# Patient Record
Sex: MALE | Race: WHITE | URBAN - METROPOLITAN AREA
[De-identification: names, ages, dates, MRNs, and addresses within clinical notes are randomized per-mention and may not be internally consistent; named-entity substitution may affect disease eponyms.]

---

## 2017-09-29 RX ORDER — ERYTHROMYCIN 250 MG/1
250 TABLET, COATED ORAL 2 TIMES DAILY
COMMUNITY

## 2017-09-29 RX ORDER — POLYETHYLENE GLYCOL 3350 17 G/17G
17 POWDER, FOR SOLUTION ORAL DAILY
COMMUNITY

## 2017-09-29 RX ORDER — ONDANSETRON 4 MG/1
4-8 TABLET, ORALLY DISINTEGRATING ORAL EVERY 6 HOURS PRN
COMMUNITY

## 2017-09-29 RX ORDER — LIDOCAINE 50 MG/G
1 PATCH TOPICAL EVERY 24 HOURS
COMMUNITY

## 2017-09-29 RX ORDER — MEGESTROL ACETATE 20 MG/1
40 TABLET ORAL EVERY 12 HOURS
COMMUNITY

## 2017-09-29 RX ORDER — BICALUTAMIDE 50 MG/1
50 TABLET, FILM COATED ORAL DAILY
COMMUNITY

## 2017-09-29 RX ORDER — BISACODYL 5 MG/1
5 TABLET, DELAYED RELEASE ORAL DAILY PRN
COMMUNITY

## 2017-10-02 ENCOUNTER — ANESTHESIA EVENT (OUTPATIENT)
Dept: SURGERY | Facility: CLINIC | Age: 57
End: 2017-10-02
Payer: MEDICARE

## 2017-10-02 ENCOUNTER — ANESTHESIA (OUTPATIENT)
Dept: SURGERY | Facility: CLINIC | Age: 57
End: 2017-10-02
Payer: MEDICARE

## 2017-10-02 ENCOUNTER — HOSPITAL ENCOUNTER (OUTPATIENT)
Facility: CLINIC | Age: 57
Discharge: HOME | End: 2017-10-02
Attending: UROLOGY | Admitting: UROLOGY
Payer: MEDICARE

## 2017-10-02 ENCOUNTER — APPOINTMENT (OUTPATIENT)
Dept: GENERAL RADIOLOGY | Facility: CLINIC | Age: 57
End: 2017-10-02
Attending: UROLOGY
Payer: MEDICARE

## 2017-10-02 VITALS
BODY MASS INDEX: 38.79 KG/M2 | RESPIRATION RATE: 14 BRPM | OXYGEN SATURATION: 92 % | SYSTOLIC BLOOD PRESSURE: 125 MMHG | WEIGHT: 292.7 LBS | DIASTOLIC BLOOD PRESSURE: 83 MMHG | HEIGHT: 73 IN | TEMPERATURE: 98.6 F

## 2017-10-02 DIAGNOSIS — N20.1 URETERAL STONE: Primary | ICD-10-CM

## 2017-10-02 LAB
GLUCOSE BLDC GLUCOMTR-MCNC: 293 MG/DL (ref 70–99)
GLUCOSE BLDC GLUCOMTR-MCNC: 378 MG/DL (ref 70–99)

## 2017-10-02 PROCEDURE — 40000277 XR SURGERY CARM FLUORO LESS THAN 5 MIN W STILLS

## 2017-10-02 PROCEDURE — 36000058 ZZH SURGERY LEVEL 3 EA 15 ADDTL MIN: Performed by: UROLOGY

## 2017-10-02 PROCEDURE — 37000008 ZZH ANESTHESIA TECHNICAL FEE, 1ST 30 MIN: Performed by: UROLOGY

## 2017-10-02 PROCEDURE — 88300 SURGICAL PATH GROSS: CPT | Performed by: UROLOGY

## 2017-10-02 PROCEDURE — 25800025 ZZH RX 258: Performed by: UROLOGY

## 2017-10-02 PROCEDURE — C2617 STENT, NON-COR, TEM W/O DEL: HCPCS | Performed by: UROLOGY

## 2017-10-02 PROCEDURE — 40000170 ZZH STATISTIC PRE-PROCEDURE ASSESSMENT II: Performed by: UROLOGY

## 2017-10-02 PROCEDURE — 82962 GLUCOSE BLOOD TEST: CPT | Mod: 91

## 2017-10-02 PROCEDURE — 27210995 ZZH RX 272: Performed by: UROLOGY

## 2017-10-02 PROCEDURE — 25000125 ZZHC RX 250: Performed by: NURSE ANESTHETIST, CERTIFIED REGISTERED

## 2017-10-02 PROCEDURE — 25000132 ZZH RX MED GY IP 250 OP 250 PS 637: Mod: GY | Performed by: UROLOGY

## 2017-10-02 PROCEDURE — A9270 NON-COVERED ITEM OR SERVICE: HCPCS | Performed by: UROLOGY

## 2017-10-02 PROCEDURE — C1758 CATHETER, URETERAL: HCPCS | Performed by: UROLOGY

## 2017-10-02 PROCEDURE — 71000013 ZZH RECOVERY PHASE 1 LEVEL 1 EA ADDTL HR: Performed by: UROLOGY

## 2017-10-02 PROCEDURE — 25000566 ZZH SEVOFLURANE, EA 15 MIN: Performed by: UROLOGY

## 2017-10-02 PROCEDURE — 25000128 H RX IP 250 OP 636: Performed by: NURSE ANESTHETIST, CERTIFIED REGISTERED

## 2017-10-02 PROCEDURE — C1769 GUIDE WIRE: HCPCS | Performed by: UROLOGY

## 2017-10-02 PROCEDURE — 36000056 ZZH SURGERY LEVEL 3 1ST 30 MIN: Performed by: UROLOGY

## 2017-10-02 PROCEDURE — 27210794 ZZH OR GENERAL SUPPLY STERILE: Performed by: UROLOGY

## 2017-10-02 PROCEDURE — 25000125 ZZHC RX 250: Performed by: UROLOGY

## 2017-10-02 PROCEDURE — 25000131 ZZH RX MED GY IP 250 OP 636 PS 637: Mod: GY | Performed by: ANESTHESIOLOGY

## 2017-10-02 PROCEDURE — 37000009 ZZH ANESTHESIA TECHNICAL FEE, EACH ADDTL 15 MIN: Performed by: UROLOGY

## 2017-10-02 PROCEDURE — 71000012 ZZH RECOVERY PHASE 1 LEVEL 1 FIRST HR: Performed by: UROLOGY

## 2017-10-02 PROCEDURE — 88300 SURGICAL PATH GROSS: CPT | Mod: 26 | Performed by: UROLOGY

## 2017-10-02 PROCEDURE — 88300 SURGICAL PATH GROSS: CPT | Mod: 26 | Performed by: PATHOLOGY

## 2017-10-02 PROCEDURE — 25000128 H RX IP 250 OP 636: Performed by: UROLOGY

## 2017-10-02 PROCEDURE — 25000128 H RX IP 250 OP 636: Performed by: ANESTHESIOLOGY

## 2017-10-02 PROCEDURE — 71000027 ZZH RECOVERY PHASE 2 EACH 15 MINS: Performed by: UROLOGY

## 2017-10-02 PROCEDURE — 82365 CALCULUS SPECTROSCOPY: CPT | Performed by: UROLOGY

## 2017-10-02 PROCEDURE — A9270 NON-COVERED ITEM OR SERVICE: HCPCS | Mod: GY | Performed by: UROLOGY

## 2017-10-02 DEVICE — STENT URETERAL DBL PIGTAIL INLAY 6FRX28CM 778628: Type: IMPLANTABLE DEVICE | Site: URETER | Status: FUNCTIONAL

## 2017-10-02 RX ORDER — FENTANYL CITRATE 50 UG/ML
INJECTION, SOLUTION INTRAMUSCULAR; INTRAVENOUS PRN
Status: DISCONTINUED | OUTPATIENT
Start: 2017-10-02 | End: 2017-10-02

## 2017-10-02 RX ORDER — HYDROCODONE BITARTRATE AND ACETAMINOPHEN 5; 325 MG/1; MG/1
1-2 TABLET ORAL EVERY 4 HOURS PRN
Qty: 30 TABLET | Refills: 0 | Status: SHIPPED | OUTPATIENT
Start: 2017-10-02

## 2017-10-02 RX ORDER — FUROSEMIDE 10 MG/ML
INJECTION INTRAMUSCULAR; INTRAVENOUS PRN
Status: DISCONTINUED | OUTPATIENT
Start: 2017-10-02 | End: 2017-10-02

## 2017-10-02 RX ORDER — SODIUM CHLORIDE, SODIUM LACTATE, POTASSIUM CHLORIDE, CALCIUM CHLORIDE 600; 310; 30; 20 MG/100ML; MG/100ML; MG/100ML; MG/100ML
INJECTION, SOLUTION INTRAVENOUS CONTINUOUS PRN
Status: DISCONTINUED | OUTPATIENT
Start: 2017-10-02 | End: 2017-10-02

## 2017-10-02 RX ORDER — FENTANYL CITRATE 0.05 MG/ML
25-50 INJECTION, SOLUTION INTRAMUSCULAR; INTRAVENOUS
Status: DISCONTINUED | OUTPATIENT
Start: 2017-10-02 | End: 2017-10-02 | Stop reason: HOSPADM

## 2017-10-02 RX ORDER — GLYCOPYRROLATE 0.2 MG/ML
INJECTION, SOLUTION INTRAMUSCULAR; INTRAVENOUS PRN
Status: DISCONTINUED | OUTPATIENT
Start: 2017-10-02 | End: 2017-10-02

## 2017-10-02 RX ORDER — MEPERIDINE HYDROCHLORIDE 25 MG/ML
12.5 INJECTION INTRAMUSCULAR; INTRAVENOUS; SUBCUTANEOUS
Status: DISCONTINUED | OUTPATIENT
Start: 2017-10-02 | End: 2017-10-02 | Stop reason: HOSPADM

## 2017-10-02 RX ORDER — PROPOFOL 10 MG/ML
INJECTION, EMULSION INTRAVENOUS PRN
Status: DISCONTINUED | OUTPATIENT
Start: 2017-10-02 | End: 2017-10-02

## 2017-10-02 RX ORDER — TOLTERODINE TARTRATE 2 MG/1
2 TABLET, EXTENDED RELEASE ORAL ONCE
Status: COMPLETED | OUTPATIENT
Start: 2017-10-02 | End: 2017-10-02

## 2017-10-02 RX ORDER — LIDOCAINE HYDROCHLORIDE 20 MG/ML
INJECTION, SOLUTION INFILTRATION; PERINEURAL PRN
Status: DISCONTINUED | OUTPATIENT
Start: 2017-10-02 | End: 2017-10-02

## 2017-10-02 RX ORDER — SODIUM CHLORIDE, SODIUM LACTATE, POTASSIUM CHLORIDE, CALCIUM CHLORIDE 600; 310; 30; 20 MG/100ML; MG/100ML; MG/100ML; MG/100ML
INJECTION, SOLUTION INTRAVENOUS CONTINUOUS
Status: DISCONTINUED | OUTPATIENT
Start: 2017-10-02 | End: 2017-10-02 | Stop reason: HOSPADM

## 2017-10-02 RX ORDER — NEOSTIGMINE METHYLSULFATE 1 MG/ML
VIAL (ML) INJECTION PRN
Status: DISCONTINUED | OUTPATIENT
Start: 2017-10-02 | End: 2017-10-02

## 2017-10-02 RX ORDER — ONDANSETRON 2 MG/ML
4 INJECTION INTRAMUSCULAR; INTRAVENOUS EVERY 30 MIN PRN
Status: DISCONTINUED | OUTPATIENT
Start: 2017-10-02 | End: 2017-10-02 | Stop reason: HOSPADM

## 2017-10-02 RX ORDER — ONDANSETRON 4 MG/1
4 TABLET, ORALLY DISINTEGRATING ORAL EVERY 30 MIN PRN
Status: DISCONTINUED | OUTPATIENT
Start: 2017-10-02 | End: 2017-10-02 | Stop reason: HOSPADM

## 2017-10-02 RX ORDER — HYDROMORPHONE HYDROCHLORIDE 1 MG/ML
.3-.5 INJECTION, SOLUTION INTRAMUSCULAR; INTRAVENOUS; SUBCUTANEOUS EVERY 10 MIN PRN
Status: DISCONTINUED | OUTPATIENT
Start: 2017-10-02 | End: 2017-10-02 | Stop reason: HOSPADM

## 2017-10-02 RX ORDER — CIPROFLOXACIN 500 MG/1
500 TABLET, FILM COATED ORAL 2 TIMES DAILY
Qty: 6 TABLET | Refills: 0 | Status: SHIPPED | OUTPATIENT
Start: 2017-10-02

## 2017-10-02 RX ORDER — NALOXONE HYDROCHLORIDE 0.4 MG/ML
.1-.4 INJECTION, SOLUTION INTRAMUSCULAR; INTRAVENOUS; SUBCUTANEOUS
Status: DISCONTINUED | OUTPATIENT
Start: 2017-10-02 | End: 2017-10-02 | Stop reason: HOSPADM

## 2017-10-02 RX ORDER — GENTAMICIN SULFATE 80 MG/100ML
80 INJECTION, SOLUTION INTRAVENOUS
Status: COMPLETED | OUTPATIENT
Start: 2017-10-02 | End: 2017-10-02

## 2017-10-02 RX ORDER — HYDROCODONE BITARTRATE AND ACETAMINOPHEN 5; 325 MG/1; MG/1
1-2 TABLET ORAL ONCE
Status: COMPLETED | OUTPATIENT
Start: 2017-10-02 | End: 2017-10-02

## 2017-10-02 RX ADMIN — PROPOFOL 200 MG: 10 INJECTION, EMULSION INTRAVENOUS at 12:36

## 2017-10-02 RX ADMIN — SODIUM CHLORIDE, POTASSIUM CHLORIDE, SODIUM LACTATE AND CALCIUM CHLORIDE: 600; 310; 30; 20 INJECTION, SOLUTION INTRAVENOUS at 12:34

## 2017-10-02 RX ADMIN — NEOSTIGMINE METHYLSULFATE 3 MG: 1 INJECTION INTRAMUSCULAR; INTRAVENOUS; SUBCUTANEOUS at 13:35

## 2017-10-02 RX ADMIN — FUROSEMIDE 10 MG: 10 INJECTION, SOLUTION INTRAVENOUS at 13:31

## 2017-10-02 RX ADMIN — DEXMEDETOMIDINE HYDROCHLORIDE 8 MCG: 100 INJECTION, SOLUTION INTRAVENOUS at 13:29

## 2017-10-02 RX ADMIN — FENTANYL CITRATE 50 MCG: 50 INJECTION, SOLUTION INTRAMUSCULAR; INTRAVENOUS at 14:03

## 2017-10-02 RX ADMIN — TOLTERODINE TARTRATE 2 MG: 2 TABLET, FILM COATED ORAL at 14:59

## 2017-10-02 RX ADMIN — MIDAZOLAM HYDROCHLORIDE 1 MG: 1 INJECTION, SOLUTION INTRAMUSCULAR; INTRAVENOUS at 12:52

## 2017-10-02 RX ADMIN — FENTANYL CITRATE 50 MCG: 50 INJECTION, SOLUTION INTRAMUSCULAR; INTRAVENOUS at 14:44

## 2017-10-02 RX ADMIN — GENTAMICIN SULFATE 80 MG: 80 INJECTION, SOLUTION INTRAVENOUS at 12:42

## 2017-10-02 RX ADMIN — HYDROCODONE BITARTRATE AND ACETAMINOPHEN 1 TABLET: 5; 325 TABLET ORAL at 15:02

## 2017-10-02 RX ADMIN — GLYCOPYRROLATE 0.6 MG: 0.2 INJECTION, SOLUTION INTRAMUSCULAR; INTRAVENOUS at 13:35

## 2017-10-02 RX ADMIN — FENTANYL CITRATE 100 MCG: 50 INJECTION, SOLUTION INTRAMUSCULAR; INTRAVENOUS at 12:35

## 2017-10-02 RX ADMIN — FENTANYL CITRATE 50 MCG: 50 INJECTION, SOLUTION INTRAMUSCULAR; INTRAVENOUS at 14:14

## 2017-10-02 RX ADMIN — SUCCINYLCHOLINE CHLORIDE 140 MG: 20 INJECTION, SOLUTION INTRAMUSCULAR; INTRAVENOUS at 12:37

## 2017-10-02 RX ADMIN — LIDOCAINE HYDROCHLORIDE 100 MG: 20 INJECTION, SOLUTION INFILTRATION; PERINEURAL at 12:36

## 2017-10-02 RX ADMIN — ROCURONIUM BROMIDE 20 MG: 10 INJECTION INTRAVENOUS at 12:47

## 2017-10-02 RX ADMIN — INSULIN ASPART 15 UNITS: 100 INJECTION, SOLUTION INTRAVENOUS; SUBCUTANEOUS at 13:02

## 2017-10-02 ASSESSMENT — ENCOUNTER SYMPTOMS: SEIZURES: 0

## 2017-10-02 ASSESSMENT — LIFESTYLE VARIABLES: TOBACCO_USE: 0

## 2017-10-02 NOTE — BRIEF OP NOTE
[facility] Brief Operative Note    Pre-operative diagnosis: LARGE IMPACTED LEFT DISTAL URETERAL STONE,LEFT HYDRO   Post-operative diagnosis SAME   Procedure: Procedure(s):  CYSTOSCOPY, LEFT RETROGRADES, LEFT URETEROSCOPY WITH HOLMIUM LASER LITHOTRIPSY AND LEFT STENT PLACEMENT  - Wound Class: II-Clean Contaminated   Surgeon(s): Surgeon(s) and Role:     * Tomás Montero MD - Primary   Estimated blood loss: 0 mL    Specimens:   ID Type Source Tests Collected by Time Destination   1 : LEFT URETERAL STONE Calculus/Stone Ureter, Left STONE ANALYSIS Tomás Montero MD 10/2/2017  1:32 PM       Findings: 1.3X0.9 CM IMPACTED LEFT DISTAL URETERAL STONE.6F X 28 CM STENT.

## 2017-10-02 NOTE — IP AVS SNAPSHOT
MRN:[id number]                      After Visit Summary   10/2/2017    Jeremy Agrawal    MRN: [id number]           Thank you!     Thank you for choosing [facility] for your care. Our goal is always to provide you with excellent care. Hearing back from our patients is one way we can continue to improve our services. Please take a few minutes to complete the written survey that you may receive in the mail after you visit with us. Thank you!        Patient Information     Date Of Birth          1960        About your hospital stay     You were admitted on:  October 2, 2017 You last received care in the:  [facility] PACU    You were discharged on:  October 2, 2017       Who to Call     For medical emergencies, please call 911.  For non-urgent questions about your medical care, please call your primary care provider or clinic, [phone]  For questions related to your surgery, please call your surgery clinic        Attending Provider     Provider Specialty    Tomás Montero MD Urology       Primary Care Provider Office Phone # Fax #    Sreekanth Yu [phone]      After Care Instructions     Discharge Instructions       Patient to arrange for follow up appointment in 2 weeksE DR GOLDBERG IN [place] IN 2 WKS TO REMOVE STENT.            Encourage fluids       Encourage fluids at home to keep urine clear to light pink            No Aspirin, Ibuprofen or Naproxen products       for 7 - 10 days following surgery                  Further instructions from your care team       Same Day Surgery Discharge Instructions for  Sedation and General Anesthesia       It's not unusual to feel dizzy, light-headed or faint for up to 24 hours after surgery or while taking pain medication.  If you have these symptoms: sit for a few minutes before standing and have someone assist you when you get up to walk or use the bathroom.      You should rest and relax for the next 24 hours. We  recommend you make arrangements to have an adult stay with you for at least 24 hours after your discharge.  Avoid hazardous and strenuous activity.      DO NOT DRIVE any vehicle or operate mechanical equipment for 24 hours following the end of your surgery.  Even though you may feel normal, your reactions may be affected by the medication you have received.      Do not drink alcoholic beverages for 24 hours following surgery.       Slowly progress to your regular diet as you feel able. It's not unusual to feel nauseated and/or vomit after receiving anesthesia.  If you develop these symptoms, drink clear liquids (apple juice, ginger ale, broth, 7-up, etc. ) until you feel better.  If your nausea and vomiting persists for 24 hours, please notify your surgeon.        All narcotic pain medications, along with inactivity and anesthesia, can cause constipation. Drinking plenty of liquids and increasing fiber intake will help.      For any questions of a medical nature, call your surgeon.      Do not make important decisions for 24 hours.      If you had general anesthesia, you may have a sore throat for a couple of days related to the breathing tube used during surgery.  You may use Cepacol lozenges to help with this discomfort.  If it worsens or if you develop a fever, contact your surgeon.       If you feel your pain is not well managed with the pain medications prescribed by your surgeon, please contact your surgeon's office to let them know so they can address your concerns.       **If you have questions or concerns about your procedure,  call Dr. Montero at [phone]**    Cystoscopy, Holmium Laser with Stent Placement Discharge Instructions    Holmium laser lithotripsy was used to break up your kidney stone(s). It is normal to have visible blood in the urine, burning, urgency and frequency following this procedure. These symptoms may last for a few days to weeks.     Diet:    To help pass stone fragments and clear  the blood out of the urine, it is important to drink 6-8 glasses of fluids per day at home - at least 3-4 glasses should be water.       Return to the diet that you were on before the procedure, unless you are given specific diet instructions.    Activity:    Walk short distances and increase as your strength allows.    You may climb stairs.    Do not do strenuous exercise or heavy lifting until approved by surgeon.    Do not drive while taking narcotic pain medications.    Bathing:    You may take a shower.          Stent information    During surgery, a stent was placed in the ureter.  The ureter is the tube that drains urine from the kidney to the bladder.  The stent is placed to dilate (open) the ureter so the stone fragments can pass easily through the ureter or to decrease ureteral swelling after surgery, or to relieve an obstruction. The stent is made of rubber. The upper end of the stent curls in the kidney while the lower end rests in the bladder.    While the stent is in place you may experience the following symptoms:    Blood and/or small blood clots in urine.    Bladder spasm (frequency and urgency of urination).    Discomfort or aching in the back or side where the stent is.    Burning or discomfort at the end of urine stream.    To decrease these symptoms you should:    Take pain medication as prescribed.    Drink plenty of fluids.    If you experience pain at the end of urination try not emptying your bladder completely.    If having discomfort in back or side, decrease activity.    Call your physician if these signs/symptoms are present:    Pain that is not relieved by a short rest or ordered pain medications.    Temperature at or above 101.0 F or chills.    Inability or difficulty urinating.     Excessive blood in urine.    Any questions or concerns.    We gave you 15 units novolog insulin at 3pm for a blood sugar = 293.                Pending Results     Date and Time Order Name Status Description  "   10/2/2017 1406 XR Surgery YVONNE L/T 5 Min Fluoro w Stills In process     10/2/2017 1351 Surgical pathology exam In process     10/2/2017 1333 Stone analysis In process             Admission Information     Date & Time Provider Department Dept. Phone    10/2/2017 Tomás Montero MD [facility] [phone]      Your Vitals Were     Blood Pressure Temperature Respirations Height Weight Pulse Oximetry    134/79 98.8  F (37.1  C) 16 1.854 m (6' 1\") 132.8 kg (292 lb 11.2 oz) 92%    BMI (Body Mass Index)                   38.62 kg/m2           MyChart Information     [facility] lets you send messages to your doctor, view your test results, renew your prescriptions, schedule appointments and more. To sign up, go to [email or web address] . Click on \"Log in\" on the left side of the screen, which will take you to the Welcome page. Then click on \"Sign up Now\" on the right side of the page.     You will be asked to enter the access code listed below, as well as some personal information. Please follow the directions to create your username and password.     Your access code is: [email or web address]  Expires: 2017  1:56 PM     Your access code will  in 90 days. If you need help or a new code, please call your [facility] clinic or [phone].        Care EveryWhere ID     This is your Care EveryWhere ID. This could be used by other organizations to access your [facility] medical records  [id number]        Equal Access to Services     [facility]: Hadii chris stout haderlin Soamanda, waaxda luqadaha, qaybta kaalmada caio, devendra idiin hayaan adeeg kharash la'aan . So [facility] [phone].    ATENCIÓN: Si habla español, tiene a nieto disposición servicios gratuitos de asistencia lingüística. Llame al [phone].    We comply with applicable federal civil rights laws and [place] laws. We do not discriminate on the basis of race, color, national origin, age, disability, sex, sexual orientation, or gender " identity.               Review of your medicines      START taking        Dose / Directions    ciprofloxacin 500 MG tablet   Commonly known as:  CIPRO   Used for:  Ureteral stone        Dose:  500 mg   Take 1 tablet (500 mg) by mouth 2 times daily   Quantity:  6 tablet   Refills:  0       HYDROcodone-acetaminophen 5-325 MG per tablet   Commonly known as:  NORCO   Used for:  Ureteral stone   Notes to Patient:  Had one at 3pm.        Dose:  1-2 tablet   Take 1-2 tablets by mouth every 4 hours as needed for moderate to severe pain (Moderate to Severe Pain)   Quantity:  30 tablet   Refills:  0         CONTINUE these medicines which have NOT CHANGED        Dose / Directions    AMITIZA PO        Dose:  24 mcg   Take 24 mcg by mouth 2 times daily (with meals)   Refills:  0       AMLODIPINE BESYLATE PO        Dose:  10 mg   Take 10 mg by mouth daily   Refills:  0       AVASTIN IV   Indication:  as needed        Dose:  1.25 mg   Inject 1.25 mg into the vein every 3 months   Refills:  0       bicalutamide 50 MG tablet   Commonly known as:  CASODEX        Dose:  50 mg   Take 50 mg by mouth daily   Refills:  0       bisacodyl 5 MG EC tablet   Commonly known as:  DULCOLAX        Dose:  5 mg   Take 5 mg by mouth daily as needed for constipation Take 3 tabs PO with bowel cleanse.  Then can use 2 PO PRN no BM x 2days   Refills:  0       calcium 500 + D 500-400 MG-UNIT Tabs   Generic drug:  Calcium Carb-Cholecalciferol        Dose:  1 tablet   Take 1 tablet by mouth 2 times daily   Refills:  0       CLONIDINE HCL PO        Dose:  0.1-0.2 mg   Take 0.1-0.2 mg by mouth 3 times daily as needed   Refills:  0       denosumab 60 MG/ML Soln injection   Commonly known as:  PROLIA        Dose:  60 mg   Inject 60 mg Subcutaneous every 6 months   Refills:  0       DICYCLOMINE HCL PO        Dose:  10 mg   Take 10 mg by mouth daily   Refills:  0       erythromycin base 250 MG tablet   Commonly known as:  E-MYCIN        Dose:  250 mg   Take 250  mg by mouth 2 times daily   Refills:  0       GABAPENTIN PO        Dose:  300 mg   Take 300 mg by mouth At Bedtime Client states takes 3 at bedtime   Refills:  0       leuprolide 22.5 MG kit   Commonly known as:  LUPRON DEPOT        Dose:  22.5 mg   Inject 22.5 mg into the muscle every 3 months As needed - as directed every 3 months   Refills:  0       LEVEMIR FLEXPEN/FLEXTOUCH 100 UNIT/ML injection   Generic drug:  insulin detemir        Dose:  54 Units   Inject 54 Units Subcutaneous At Bedtime   Refills:  0       lidocaine 5 % Patch   Commonly known as:  LIDODERM        Dose:  1 patch   Place 1 patch onto the skin every 24 hours Use as directed   Refills:  0       LISINOPRIL PO        Dose:  40 mg   Take 40 mg by mouth daily   Refills:  0       megestrol 20 MG tablet   Commonly known as:  MEGACE        Dose:  40 mg   Take 40 mg by mouth every 12 hours   Refills:  0       METOPROLOL TARTRATE PO        Dose:  25 mg   Take 25 mg by mouth daily   Refills:  0       NovoLOG FLEXPEN 100 UNIT/ML injection   Generic drug:  insulin aspart        Inject Subcutaneous 4 times daily (with meals and nightly) Administer up to 40 units under the skin per sliding scale   Refills:  0       ondansetron 4 MG ODT tab   Commonly known as:  ZOFRAN-ODT        Dose:  4-8 mg   Take 4-8 mg by mouth every 6 hours as needed for nausea   Refills:  0       PANTOPRAZOLE SODIUM PO        Dose:  40 mg   Take 40 mg by mouth every morning (before breakfast)   Refills:  0       polyethylene glycol powder   Commonly known as:  MIRALAX/GLYCOLAX        Dose:  17 g   Take 17 g by mouth daily   Refills:  0       ROSUVASTATIN CALCIUM PO        Dose:  40 mg   Take 40 mg by mouth daily   Refills:  0       TAMSULOSIN HCL PO        Dose:  0.8 mg   Take 0.8 mg by mouth At Bedtime   Refills:  0         STOP taking     AMITRIPTYLINE HCL PO                Where to get your medicines      These medications were sent to [facility] [place], [place] - [address]  [address] [place] [address]     Phone:  [phone]     ciprofloxacin 500 MG tablet         Some of these will need a paper prescription and others can be bought over the counter. Ask your nurse if you have questions.     Bring a paper prescription for each of these medications     HYDROcodone-acetaminophen 5-325 MG per tablet               ANTIBIOTIC INSTRUCTION     You've Been Prescribed an Antibiotic - Now What?  Your healthcare team thinks that you or your loved one might have an infection. Some infections can be treated with antibiotics, which are powerful, life-saving drugs. Like all medications, antibiotics have side effects and should only be used when necessary. There are some important things you should know about your antibiotic treatment.      Your healthcare team may run tests before you start taking an antibiotic.    Your team may take samples (e.g., from your blood, urine or other areas) to run tests to look for bacteria. These test can be important to determine if you need an antibiotic at all and, if you do, which antibiotic will work best.      Within a few days, your healthcare team might change or even stop your antibiotic.    Your team may start you on an antibiotic while they are working to find out what is making you sick.    Your team might change your antibiotic because test results show that a different antibiotic would be better to treat your infection.    In some cases, once your team has more information, they learn that you do not need an antibiotic at all. They may find out that you don't have an infection, or that the antibiotic you're taking won't work against your infection. For example, an infection caused by a virus can't be treated with antibiotics. Staying on an antibiotic when you don't need it is more likely to be harmful than helpful.      You may experience side effects from your antibiotic.    Like all medications, antibiotics have side  effects. Some of these can be serious.    Let you healthcare team know if you have any known allergies when you are admitted to the hospital.    One significant side effect of nearly all antibiotics is the risk of severe and sometimes deadly diarrhea caused by Clostridium difficile (C. Difficile). This occurs when a person takes antibiotics because some good germs are destroyed. Antibiotic use allows C. diificile to take over, putting patients at high risk for this serious infection.    As a patient or caregiver, it is important to understand your or your loved one's antibiotic treatment. It is especially important for caregivers to speak up when patients can't speak for themselves. Here are some important questions to ask your healthcare team.    What infection is this antibiotic treating and how do you know I have that infection?    What side effects might occur from this antibiotic?    How long will I need to take this antibiotic?    Is it safe to take this antibiotic with other medications or supplements (e.g., vitamins) that I am taking?     Are there any special directions I need to know about taking this antibiotic? For example, should I take it with food?    How will I be monitored to know whether my infection is responding to the antibiotic?    What tests may help to make sure the right antibiotic is prescribed for me?      Information provided by:  www.cdc.gov/getsmart  U.S. Department of Health and Human Services  Centers for disease Control and Prevention  National Center for Emerging and Zoonotic Infectious Diseases  Division of Healthcare Quality Promotion         Protect others around you: Learn how to safely use, store and throw away your medicines at www.disposemymeds.org.             Medication List: This is a list of all your medications and when to take them. Check marks below indicate your daily home schedule. Keep this list as a reference.      Medications           Morning Afternoon Evening  Bedtime As Needed    AMITIZA PO   Take 24 mcg by mouth 2 times daily (with meals)                                AMLODIPINE BESYLATE PO   Take 10 mg by mouth daily                                AVASTIN IV   Inject 1.25 mg into the vein every 3 months                                bicalutamide 50 MG tablet   Commonly known as:  CASODEX   Take 50 mg by mouth daily                                bisacodyl 5 MG EC tablet   Commonly known as:  DULCOLAX   Take 5 mg by mouth daily as needed for constipation Take 3 tabs PO with bowel cleanse.  Then can use 2 PO PRN no BM x 2days                                calcium 500 + D 500-400 MG-UNIT Tabs   Take 1 tablet by mouth 2 times daily   Generic drug:  Calcium Carb-Cholecalciferol                                ciprofloxacin 500 MG tablet   Commonly known as:  CIPRO   Take 1 tablet (500 mg) by mouth 2 times daily                                CLONIDINE HCL PO   Take 0.1-0.2 mg by mouth 3 times daily as needed                                denosumab 60 MG/ML Soln injection   Commonly known as:  PROLIA   Inject 60 mg Subcutaneous every 6 months                                DICYCLOMINE HCL PO   Take 10 mg by mouth daily                                erythromycin base 250 MG tablet   Commonly known as:  E-MYCIN   Take 250 mg by mouth 2 times daily                                GABAPENTIN PO   Take 300 mg by mouth At Bedtime Client states takes 3 at bedtime                                HYDROcodone-acetaminophen 5-325 MG per tablet   Commonly known as:  NORCO   Take 1-2 tablets by mouth every 4 hours as needed for moderate to severe pain (Moderate to Severe Pain)   Last time this was given:  1 tablet on 10/2/2017  3:02 PM   Notes to Patient:  Had one at 3pm.                                leuprolide 22.5 MG kit   Commonly known as:  LUPRON DEPOT   Inject 22.5 mg into the muscle every 3 months As needed - as directed every 3 months                                MANUEL  FLEXPEN/FLEXTOUCH 100 UNIT/ML injection   Inject 54 Units Subcutaneous At Bedtime   Generic drug:  insulin detemir                                lidocaine 5 % Patch   Commonly known as:  LIDODERM   Place 1 patch onto the skin every 24 hours Use as directed                                LISINOPRIL PO   Take 40 mg by mouth daily                                megestrol 20 MG tablet   Commonly known as:  MEGACE   Take 40 mg by mouth every 12 hours                                METOPROLOL TARTRATE PO   Take 25 mg by mouth daily                                NovoLOG FLEXPEN 100 UNIT/ML injection   Inject Subcutaneous 4 times daily (with meals and nightly) Administer up to 40 units under the skin per sliding scale   Last time this was given:  15 Units on 10/2/2017  2:55 PM   Generic drug:  insulin aspart                                ondansetron 4 MG ODT tab   Commonly known as:  ZOFRAN-ODT   Take 4-8 mg by mouth every 6 hours as needed for nausea                                PANTOPRAZOLE SODIUM PO   Take 40 mg by mouth every morning (before breakfast)                                polyethylene glycol powder   Commonly known as:  MIRALAX/GLYCOLAX   Take 17 g by mouth daily                                ROSUVASTATIN CALCIUM PO   Take 40 mg by mouth daily                                TAMSULOSIN HCL PO   Take 0.8 mg by mouth At Bedtime

## 2017-10-02 NOTE — ANESTHESIA CARE TRANSFER NOTE
Patient: Jeremy Agrawal    Procedure(s):  CYSTOSCOPY, LEFT RETROGRADES, LEFT URETEROSCOPY WITH HOLMIUM LASER LITHOTRIPSY AND LEFT STENT PLACEMENT  - Wound Class: II-Clean Contaminated    Diagnosis: LEFT DISTAL URETERAL STONE  Diagnosis Additional Information: No value filed.    Anesthesia Type:   General, ETT     Note:  Airway :Face Mask  Patient transferred to:PACU  Comments: Pt exhibits spont resps, all monitors and alarms on in pacu, report given to RN, vss.      Vitals: (Last set prior to Anesthesia Care Transfer)    CRNA VITALS  10/2/2017 1315 - 10/2/2017 1353      10/2/2017             Pulse: 84    SpO2: 98 %    Resp Rate (set): 10                Electronically Signed By: RONY Milligan CRNA  October 2, 2017  1:53 PM

## 2017-10-02 NOTE — IP AVS SNAPSHOT
[facility] [address]    [place] [address]    Phone:  [phone]                                       After Visit Summary   10/2/2017    Jeremy Agrawal    MRN: [id number]           After Visit Summary Signature Page     I have received my discharge instructions, and my questions have been answered. I have discussed any challenges I see with this plan with the nurse or doctor.    ..........................................................................................................................................  Patient/Patient Representative Signature      ..........................................................................................................................................  Patient Representative Print Name and Relationship to Patient    ..................................................               ................................................  Date                                            Time    ..........................................................................................................................................  Reviewed by Signature/Title    ...................................................              ..............................................  Date                                                            Time

## 2017-10-02 NOTE — DISCHARGE INSTRUCTIONS
Same Day Surgery Discharge Instructions for  Sedation and General Anesthesia       It's not unusual to feel dizzy, light-headed or faint for up to 24 hours after surgery or while taking pain medication.  If you have these symptoms: sit for a few minutes before standing and have someone assist you when you get up to walk or use the bathroom.      You should rest and relax for the next 24 hours. We recommend you make arrangements to have an adult stay with you for at least 24 hours after your discharge.  Avoid hazardous and strenuous activity.      DO NOT DRIVE any vehicle or operate mechanical equipment for 24 hours following the end of your surgery.  Even though you may feel normal, your reactions may be affected by the medication you have received.      Do not drink alcoholic beverages for 24 hours following surgery.       Slowly progress to your regular diet as you feel able. It's not unusual to feel nauseated and/or vomit after receiving anesthesia.  If you develop these symptoms, drink clear liquids (apple juice, ginger ale, broth, 7-up, etc. ) until you feel better.  If your nausea and vomiting persists for 24 hours, please notify your surgeon.        All narcotic pain medications, along with inactivity and anesthesia, can cause constipation. Drinking plenty of liquids and increasing fiber intake will help.      For any questions of a medical nature, call your surgeon.      Do not make important decisions for 24 hours.      If you had general anesthesia, you may have a sore throat for a couple of days related to the breathing tube used during surgery.  You may use Cepacol lozenges to help with this discomfort.  If it worsens or if you develop a fever, contact your surgeon.       If you feel your pain is not well managed with the pain medications prescribed by your surgeon, please contact your surgeon's office to let them know so they can address your concerns.       **If you have questions or concerns about  your procedure,  call Dr. Montero at [phone]**    Cystoscopy, Holmium Laser with Stent Placement Discharge Instructions    Holmium laser lithotripsy was used to break up your kidney stone(s). It is normal to have visible blood in the urine, burning, urgency and frequency following this procedure. These symptoms may last for a few days to weeks.     Diet:    To help pass stone fragments and clear the blood out of the urine, it is important to drink 6-8 glasses of fluids per day at home - at least 3-4 glasses should be water.       Return to the diet that you were on before the procedure, unless you are given specific diet instructions.    Activity:    Walk short distances and increase as your strength allows.    You may climb stairs.    Do not do strenuous exercise or heavy lifting until approved by surgeon.    Do not drive while taking narcotic pain medications.    Bathing:    You may take a shower.          Stent information    During surgery, a stent was placed in the ureter.  The ureter is the tube that drains urine from the kidney to the bladder.  The stent is placed to dilate (open) the ureter so the stone fragments can pass easily through the ureter or to decrease ureteral swelling after surgery, or to relieve an obstruction. The stent is made of rubber. The upper end of the stent curls in the kidney while the lower end rests in the bladder.    While the stent is in place you may experience the following symptoms:    Blood and/or small blood clots in urine.    Bladder spasm (frequency and urgency of urination).    Discomfort or aching in the back or side where the stent is.    Burning or discomfort at the end of urine stream.    To decrease these symptoms you should:    Take pain medication as prescribed.    Drink plenty of fluids.    If you experience pain at the end of urination try not emptying your bladder completely.    If having discomfort in back or side, decrease activity.    Call your physician if  these signs/symptoms are present:    Pain that is not relieved by a short rest or ordered pain medications.    Temperature at or above 101.0 F or chills.    Inability or difficulty urinating.     Excessive blood in urine.    Any questions or concerns.    We gave you 15 units novolog insulin at 3pm for a blood sugar = 293.

## 2017-10-02 NOTE — ANESTHESIA PREPROCEDURE EVALUATION
Anesthesia Evaluation     . Pt has had prior anesthetic.     History of anesthetic complications   - PONV        ROS/MED HX    ENT/Pulmonary:     (+)RADHA risk factors snores loudly, hypertension, obese, , . .   (-) tobacco use and sleep apnea   Neurologic:     (+)CVA    (-) seizures   Cardiovascular:     (+) Dyslipidemia, hypertension----. : . . . :. .       METS/Exercise Tolerance:     Hematologic:         Musculoskeletal:   (+) arthritis, , , -       GI/Hepatic:     (+) GERD Asymptomatic on medication,      (-) liver disease   Renal/Genitourinary:     (+) Nephrolithiasis , BPH,       Endo:     (+) type II DM Using insulin Diabetic complications: neuropathy, Obesity, .      Psychiatric:         Infectious Disease:         Malignancy:   (+) Malignancy History of Prostate          Other:    (+) H/O Chronic Pain,                   Physical Exam  Normal systems: cardiovascular and pulmonary    Airway   Mallampati: III  TM distance: >3 FB  Neck ROM: full    Dental   (+) upper dentures, lower dentures and missing    Cardiovascular       Pulmonary                     Anesthesia Plan      History & Physical Review  History and physical reviewed and following examination; no interval change.    ASA Status:  3 .    NPO Status:  > 8 hours    Plan for General and ETT with Intravenous induction. Maintenance will be Balanced.    PONV prophylaxis:  Ondansetron (or other 5HT-3) and Dexamethasone or Solumedrol  Additional equipment: Videolaryngoscope      Postoperative Care  Postoperative pain management:  IV analgesics.      Consents  Anesthetic plan, risks, benefits and alternatives discussed with:  Patient..          Procedure: Procedure(s):  COMBINED CYSTOSCOPY, RETROGRADES, URETEROSCOPY, LASER HOLMIUM LITHOTRIPSY URETER(S), INSERT STENT  Preop diagnosis: LEFT DISTAL URETERAL STONE    Allergies   Allergen Reactions     Morphine      Hallucinations     Ritalin [Methylphenidate] Rash     Past Medical History:   Diagnosis Date      Cataract      Chronic constipation      Chronic pain      Diabetes (H)      Diabetic gastroparesis (H)      Diabetic peripheral neuropathy (H)      Elevated LFTs      Fibromyalgia      Fibromyalgia      Gastroesophageal reflux disease      Hypertension      Insomnia      Nephrolithiasis      Onychomycosis      Osteopenia      PONV (postoperative nausea and vomiting)      Prostate cancer (H)      Past Surgical History:   Procedure Laterality Date     APPENDECTOMY       CHOLECYSTECTOMY       laser coagulation       prostate     Prior to Admission medications    Medication Sig Start Date End Date Taking? Authorizing Provider   Lubiprostone (AMITIZA PO) Take 24 mcg by mouth 2 times daily (with meals)   Yes Reported, Patient   AMITRIPTYLINE HCL PO Take 25 mg by mouth daily    Yes Reported, Patient   AMLODIPINE BESYLATE PO Take 10 mg by mouth daily   Yes Reported, Patient   bicalutamide (CASODEX) 50 MG tablet Take 50 mg by mouth daily   Yes Reported, Patient   Calcium Carb-Cholecalciferol (CALCIUM 500 + D) 500-400 MG-UNIT TABS Take 1 tablet by mouth 2 times daily   Yes Reported, Patient   CLONIDINE HCL PO Take 0.1-0.2 mg by mouth 3 times daily as needed   Yes Reported, Patient   DICYCLOMINE HCL PO Take 10 mg by mouth daily    Yes Reported, Patient   bisacodyl (DULCOLAX) 5 MG EC tablet Take 5 mg by mouth daily as needed for constipation Take 3 tabs PO with bowel cleanse.  Then can use 2 PO PRN no BM x 2days   Yes Reported, Patient   erythromycin base (E-MYCIN) 250 MG tablet Take 250 mg by mouth 2 times daily   Yes Reported, Patient   GABAPENTIN PO Take 300 mg by mouth At Bedtime Client states takes 3 at bedtime   Yes Reported, Patient   insulin detemir (LEVEMIR FLEXPEN/FLEXTOUCH) 100 UNIT/ML injection Inject 54 Units Subcutaneous At Bedtime   Yes Reported, Patient   LISINOPRIL PO Take 40 mg by mouth daily   Yes Reported, Patient   leuprolide (LUPRON DEPOT) 22.5 MG kit Inject 22.5 mg into the muscle every 3 months As  needed - as directed every 3 months   Yes Reported, Patient   megestrol (MEGACE) 20 MG tablet Take 40 mg by mouth every 12 hours   Yes Reported, Patient   METOPROLOL TARTRATE PO Take 25 mg by mouth daily   Yes Reported, Patient   insulin aspart (NOVOLOG FLEXPEN) 100 UNIT/ML injection Inject Subcutaneous 4 times daily (with meals and nightly) Administer up to 40 units under the skin per sliding scale   Yes Reported, Patient   ondansetron (ZOFRAN-ODT) 4 MG ODT tab Take 4-8 mg by mouth every 6 hours as needed for nausea   Yes Reported, Patient   PANTOPRAZOLE SODIUM PO Take 40 mg by mouth every morning (before breakfast)   Yes Reported, Patient   polyethylene glycol (MIRALAX/GLYCOLAX) powder Take 17 g by mouth daily   Yes Reported, Patient   ROSUVASTATIN CALCIUM PO Take 40 mg by mouth daily   Yes Reported, Patient   TAMSULOSIN HCL PO Take 0.8 mg by mouth At Bedtime   Yes Reported, Patient   Bevacizumab (AVASTIN IV) Inject 1.25 mg into the vein every 3 months     Reported, Patient   lidocaine (LIDODERM) 5 % Patch Place 1 patch onto the skin every 24 hours Use as directed    Reported, Patient   denosumab (PROLIA) 60 MG/ML SOLN injection Inject 60 mg Subcutaneous every 6 months    Reported, Patient     Current Facility-Administered Medications Ordered in Epic   Medication Dose Route Frequency Last Rate Last Dose     Provider ordered ALTERNATE pre op antibiotic.  1 each As instructed Continuous         gentamicin (GARAMYCIN) infusion 80 mg  80 mg Intravenous Pre-Op/Pre-procedure x 1 dose         insulin aspart (NovoLOG) inj (RAPID ACTING)  15 Units Subcutaneous Pre-Op/Pre-procedure x 1 dose         No current [facility]-ordered outpatient prescriptions on file.     Wt Readings from Last 1 Encounters:   10/02/17 132.8 kg (292 lb 11.2 oz)     Temp Readings from Last 1 Encounters:   10/02/17 36.7  C (98.1  F) (Oral)     BP Readings from Last 6 Encounters:   10/02/17 158/85     Pulse Readings from Last 4 Encounters:   No data found  for Pulse     Resp Readings from Last 1 Encounters:   10/02/17 16     SpO2 Readings from Last 1 Encounters:   10/02/17 98%     No results for input(s): NA, POTASSIUM, CHLORIDE, CO2, ANIONGAP, GLC, BUN, CR, CHEO in the last 96538 hours.  No results for input(s): WBC, HGB, PLT in the last 27309 hours.  No results for input(s): INR in the last 60526 hours.    Invalid input(s): APTT   RECENT LABS:   ECG:   ECHO:   CXR:                    .

## 2017-10-02 NOTE — ANESTHESIA POSTPROCEDURE EVALUATION
Patient: Jeremy Agrawal    Procedure(s):  CYSTOSCOPY, LEFT RETROGRADES, LEFT URETEROSCOPY WITH HOLMIUM LASER LITHOTRIPSY AND LEFT STENT PLACEMENT  - Wound Class: II-Clean Contaminated    Diagnosis:LEFT DISTAL URETERAL STONE  Diagnosis Additional Information: No value filed.    Anesthesia Type:  General, ETT    Note:  Anesthesia Post Evaluation    Patient location during evaluation: PACU  Patient participation: Able to fully participate in evaluation  Level of consciousness: awake and alert  Pain management: satisfactory to patient  Airway patency: patent  Cardiovascular status: hemodynamically stable  Respiratory status: acceptable and unassisted  Hydration status: balanced  PONV: none     Anesthetic complications: None          Last vitals:  Vitals:    10/02/17 1445 10/02/17 1500 10/02/17 1515   BP: 134/79 135/89 132/83   Resp: 16 16 12   Temp: 37.1  C (98.8  F) 37  C (98.6  F) 37  C (98.6  F)   SpO2: 92% 92% 92%         Electronically Signed By: Joaquin Oliva MD  October 2, 2017  4:02 PM

## 2017-10-03 LAB — COPATH REPORT: NORMAL

## 2017-10-03 NOTE — OP NOTE
DATE OF PROCEDURE:  10/02/2017      PREOPERATIVE DIAGNOSES:   1.  Large left impacted distal ureteral stone (1.3 cm x 0.9 cm).   2.  Left hydronephrosis.   3.  Left flank pain.      POSTOPERATIVE DIAGNOSES:   1.  Large left impacted distal ureteral stone (1.3 cm x 0.9 cm).   2.  Left hydronephrosis.   3.  Left flank pain.      PROCEDURES:   1.  Cystoscopy with left retrograde pyelogram.   2.  Left rigid ureteroscopy with holmium laser lithotripsy.   3.  Left ureteral stent placement.      SURGEON:  Tomás Montero MD      ANESTHESIA:  General.      ESTIMATED BLOOD LOSS:  None.      SUMMARY OF FINDINGS:  Impacted left distal ureteral stone fragmented with holmium laser majority of fragments washed into the bladder.  6 Indonesian x 28 cm stent.      BRIEF HISTORY AND PHYSICAL:  Jeremy Agrawal is a 57-year-old male with a history of left flank pain secondary to a large 1.3 cm x 0.9 cm left lower ureteral stone.  The patient was referred by Dr. Goldberg in [place].  He apparently had undergone previous shock wave lithotripsy which was not successful.  The patient received information concerning alternatives.  All questions were answered to his satisfaction.  He is now scheduled.      DESCRIPTION OF PROCEDURE:  Proper permits were obtained and signed, patient was taken after general anesthesia, he was prepped and draped in the usual sterile fashion.  A 22 Indonesian Storz scope was gently passed after mild dilation of the distal ureter which was rather tight.  The patient does have a history of prostate cancer, underwent previous radiation therapy in the past.  The scope was gently passed through the bulbous urethra through somewhat scarred prostatic urethra into the bladder.  The left ureteral orifice was quite small.  He underwent a left retrograde pyelogram which demonstrated evidence for a filling defect located approximately 7 cm from the ureteral orifice.  Dye was pushed by the stone and demonstrated evidence of a  dilated ureter and left hydronephrosis.  Multiple attempts were performed to pass a wire by the impacted obstructing stone.  A solo wire, a straight glide and a curved glide but none would bypass the obstruction.  A double-lumen catheter was placed over the Glidewire up to the level of the stone to dilate the distal ureter.  He then underwent rigid ureteroscopy without a safety guidewire, which could not be passed.  The stone was visualized.  There was significant edema surrounding the stone.  He underwent holmium laser lithotripsy and the stone was broken into multiple small fragments.  Eventually, the stone was passed a Glidewire then was placed through the rigid scope into the kidney under fluoroscopic control.  The rigid scope was then removed and then again passed up the ureter to continue fragmenting the stones into small fragments and a large amount of volume stones were passed into the bladder.  The ureter was somewhat edematous, but there were no injuries noted to the area.  The scope was then removed.  A 6 Qatari x 28 cm stent was placed over the Glidewire into the upper pole marta and bladder under fluoroscopic control.  Xylocaine jelly was injected into the urethra, Jeffrey catheter was placed to recovery.  He was given 10 mg of Lasix.      He will require a second procedure to remove his stent in approximately 2 weeks.  Several small stone fragments were sent for stone analysis.  He will follow up with Dr. Goldberg in [place] to remove his stent.         ARMANDO MONTERO MD             D: 10/02/2017 16:36   T: 10/02/2017 22:23   MT: [id number]      Name:     CHINEDU JUAN   MRN:      [id number]        Account:        [id number]   :      1960           Procedure Date: 10/02/2017      Document: [id number]       cc: Michael Montero MD

## 2017-10-07 LAB
APPEARANCE STONE: NORMAL
COMPN STONE: NORMAL
NUMBER STONE: 2
SIZE STONE: NORMAL MM
WT STONE: 12 MG

## (undated) DEVICE — LASER FIBER HOLMIUM FLEXIVA 365UM M0068403920

## (undated) DEVICE — SOL WATER IRRIG 3000ML BAG 2B7117

## (undated) DEVICE — CATH URETERAL DL 6FR FLEX-TIP 10FRX50CM G17323 AQ-022610

## (undated) DEVICE — PACK CYSTOSCOPY SBA15CYFSI

## (undated) DEVICE — GLOVE PROTEXIS W/NEU-THERA 7.5  2D73TE75

## (undated) DEVICE — CATH URETERAL OPEN END 6FR AXXCESS

## (undated) DEVICE — DRAPE GYN/UROLOGY FLUID POUCH TUR 29455

## (undated) DEVICE — LINEN TOWEL PACK X5 5464

## (undated) DEVICE — GUIDEWIRE URO SOLO FLEX STR 0.035"X150CM HW35FS

## (undated) DEVICE — RAD RX ISOVUE 300 (50ML) 61% IOPAMIDOL CHARGE PER ML

## (undated) DEVICE — PAD CHUX UNDERPAD 23X24" 7136

## (undated) DEVICE — GUIDEWIRE URO ANG STIFF .035"X150CM NITINOL 150NSA35

## (undated) RX ORDER — FENTANYL CITRATE 50 UG/ML
INJECTION, SOLUTION INTRAMUSCULAR; INTRAVENOUS
Status: DISPENSED
Start: 2017-10-02

## (undated) RX ORDER — NEOSTIGMINE METHYLSULFATE 1 MG/ML
VIAL (ML) INJECTION
Status: DISPENSED
Start: 2017-10-02

## (undated) RX ORDER — GENTAMICIN SULFATE 80 MG/100ML
INJECTION, SOLUTION INTRAVENOUS
Status: DISPENSED
Start: 2017-10-02

## (undated) RX ORDER — FUROSEMIDE 10 MG/ML
INJECTION INTRAMUSCULAR; INTRAVENOUS
Status: DISPENSED
Start: 2017-10-02

## (undated) RX ORDER — HYDROCODONE BITARTRATE AND ACETAMINOPHEN 5; 325 MG/1; MG/1
TABLET ORAL
Status: DISPENSED
Start: 2017-10-02

## (undated) RX ORDER — ONDANSETRON 2 MG/ML
INJECTION INTRAMUSCULAR; INTRAVENOUS
Status: DISPENSED
Start: 2017-10-02

## (undated) RX ORDER — LIDOCAINE HYDROCHLORIDE 20 MG/ML
INJECTION, SOLUTION EPIDURAL; INFILTRATION; INTRACAUDAL; PERINEURAL
Status: DISPENSED
Start: 2017-10-02

## (undated) RX ORDER — GLYCOPYRROLATE 0.2 MG/ML
INJECTION, SOLUTION INTRAMUSCULAR; INTRAVENOUS
Status: DISPENSED
Start: 2017-10-02

## (undated) RX ORDER — PROPOFOL 10 MG/ML
INJECTION, EMULSION INTRAVENOUS
Status: DISPENSED
Start: 2017-10-02

## (undated) RX ORDER — DEXAMETHASONE SODIUM PHOSPHATE 4 MG/ML
INJECTION, SOLUTION INTRA-ARTICULAR; INTRALESIONAL; INTRAMUSCULAR; INTRAVENOUS; SOFT TISSUE
Status: DISPENSED
Start: 2017-10-02